# Patient Record
Sex: MALE | URBAN - METROPOLITAN AREA
[De-identification: names, ages, dates, MRNs, and addresses within clinical notes are randomized per-mention and may not be internally consistent; named-entity substitution may affect disease eponyms.]

---

## 2024-12-24 ENCOUNTER — OFFICE VISIT (OUTPATIENT)
Dept: URGENT CARE | Age: 39
End: 2024-12-24
Payer: COMMERCIAL

## 2024-12-24 ENCOUNTER — ANCILLARY PROCEDURE (OUTPATIENT)
Dept: URGENT CARE | Age: 39
End: 2024-12-24
Payer: COMMERCIAL

## 2024-12-24 VITALS
OXYGEN SATURATION: 99 % | DIASTOLIC BLOOD PRESSURE: 90 MMHG | HEART RATE: 70 BPM | BODY MASS INDEX: 32.08 KG/M2 | RESPIRATION RATE: 16 BRPM | WEIGHT: 230 LBS | SYSTOLIC BLOOD PRESSURE: 138 MMHG | TEMPERATURE: 96.6 F

## 2024-12-24 DIAGNOSIS — M79.672 LEFT FOOT PAIN: ICD-10-CM

## 2024-12-24 DIAGNOSIS — M10.9 GOUT, ARTHRITIS: Primary | ICD-10-CM

## 2024-12-24 PROCEDURE — 73630 X-RAY EXAM OF FOOT: CPT | Mod: LEFT SIDE | Performed by: FAMILY MEDICINE

## 2024-12-24 PROCEDURE — 99202 OFFICE O/P NEW SF 15 MIN: CPT | Performed by: PHYSICIAN ASSISTANT

## 2024-12-24 PROCEDURE — 1036F TOBACCO NON-USER: CPT | Performed by: PHYSICIAN ASSISTANT

## 2024-12-24 RX ORDER — SOLIFENACIN SUCCINATE 5 MG/1
5 TABLET, FILM COATED ORAL DAILY
COMMUNITY

## 2024-12-24 RX ORDER — SERTRALINE HYDROCHLORIDE 25 MG/1
TABLET, FILM COATED ORAL
COMMUNITY

## 2024-12-24 RX ORDER — LISINOPRIL 20 MG/1
20 TABLET ORAL DAILY
COMMUNITY

## 2024-12-24 ASSESSMENT — ENCOUNTER SYMPTOMS
DIARRHEA: 0
RHINORRHEA: 0
NAUSEA: 0
VOMITING: 0
NUMBNESS: 0
EYE REDNESS: 0
FATIGUE: 0
EYE ITCHING: 0
CHEST TIGHTNESS: 0
SORE THROAT: 0
ABDOMINAL PAIN: 0
COUGH: 0
WEAKNESS: 0
SHORTNESS OF BREATH: 0
FEVER: 0
EYE DISCHARGE: 0
EYE PAIN: 0
JOINT SWELLING: 0
SINUS PAIN: 0
ARTHRALGIAS: 1
CHILLS: 0

## 2024-12-24 NOTE — PROGRESS NOTES
Subjective   Patient ID: Aquilino Graham is a 39 y.o. male. They present today with a chief complaint of Left foot injury (Left foot pain with unknown injury).    History of Present Illness  Pt states he was playing Hockey on Sunday and noticed pain since yesterday. Denies traumatic injury and reports hx of arthritis but not sure what type of arthritis, possibly gout but not sure. Pain with movement, no weakness, change of sensation          Past Medical History  Allergies as of 12/24/2024 - never reviewed   Allergen Reaction Noted    Bacitracin zinc-polymyxin b Hives 12/24/2024       (Not in a hospital admission)       Past Medical History:   Diagnosis Date    Struck by ice hockey puck, initial encounter 03/29/2018    Struck by ice hockey puck, initial encounter       No past surgical history on file.     reports that he has never smoked. He has never used smokeless tobacco.    Review of Systems  Review of Systems   Constitutional:  Negative for chills, fatigue and fever.   HENT:  Negative for ear discharge, ear pain, rhinorrhea, sinus pain, sneezing and sore throat.    Eyes:  Negative for pain, discharge, redness and itching.   Respiratory:  Negative for cough, chest tightness and shortness of breath.    Cardiovascular:  Negative for chest pain.   Gastrointestinal:  Negative for abdominal pain, diarrhea, nausea and vomiting.   Musculoskeletal:  Positive for arthralgias. Negative for joint swelling.   Skin:  Negative for rash.   Neurological:  Negative for weakness and numbness.                                  Objective    Vitals:    12/24/24 0958   BP: 138/90   Pulse: 70   Resp: 16   Temp: 35.9 °C (96.6 °F)   SpO2: 99%   Weight: 104 kg (230 lb)     No LMP for male patient.    Physical Exam  Constitutional:       Appearance: Normal appearance.   HENT:      Head: Normocephalic and atraumatic.      Right Ear: Tympanic membrane, ear canal and external ear normal.      Left Ear: Tympanic membrane, ear canal and  external ear normal.   Cardiovascular:      Rate and Rhythm: Normal rate and regular rhythm.      Heart sounds: No murmur heard.  Pulmonary:      Effort: Pulmonary effort is normal. No respiratory distress.      Breath sounds: No stridor. No wheezing, rhonchi or rales.   Musculoskeletal:         General: Tenderness and signs of injury present. No swelling.      Right foot: Normal. No swelling, deformity, tenderness or bony tenderness.      Left foot: Tenderness and bony tenderness present.      Comments: Tenderness along left 1st MTP joint w/o swelling or warmth or skin changes, mild to moderate pain with movement   Skin:     General: Skin is warm and dry.   Neurological:      Mental Status: He is alert and oriented to person, place, and time.   Psychiatric:         Mood and Affect: Mood normal.         Procedures    Point of Care Test & Imaging Results from this visit  No results found for this visit on 12/24/24.   XR foot left 3+ views    Result Date: 12/24/2024  Interpreted By:  Rain Zapata, STUDY: XR FOOT LEFT 3+ VIEWS; ;  12/24/2024 10:04 am   INDICATION: Signs/Symptoms:left foot pain mainly at big toe.   ,M79.672 Pain in left foot   COMPARISON: None.   ACCESSION NUMBER(S): [id number]   ORDERING CLINICIAN: JOSIANE SANCHEZ   FINDINGS: Three views left foot: There is swelling of the 1st metatarsophalangeal joint. There is a marginal erosion involving the lateral aspect of the head of the 1st metatarsal. Findings are consistent with gouty arthritis. Perhaps this is acute exacerbation. There is no fracture or dislocation.       Acute gouty arthritis 1st metatarsophalangeal joint left foot.     MACRO: None   Signed by: Rain Zapata 12/24/2024 10:40 AM Dictation workstation:   [id number]     Diagnostic study results (if any) were reviewed by Linnea Cortez PA-C.    Assessment/Plan   Allergies, medications, history, and pertinent labs/EKGs/Imaging reviewed by Linnea Cortez PA-C.     Medical Decision Making  X-ray  preliminary reading no acute findings for fx or dislocation  Possible strain/sprain, no typical signs of acute inflammation or infection, final x-ray reading support acute gouty arthritis, pt prefers OTC Ibuprofen       Orders and Diagnoses  Diagnoses and all orders for this visit:  Left foot pain  -     XR foot left 3+ views; Future      Medical Admin Record      Patient disposition: Home    Electronically signed by Linnea Cortez PA-C  10:46 AM